# Patient Record
(demographics unavailable — no encounter records)

---

## 2025-03-18 NOTE — HISTORY OF PRESENT ILLNESS
[de-identified] : 14-year-old male is here with his dad for evaluation of his right shoulder.  Patient states been having pain in the shoulder for the last 2 weeks.  He had no specific injury or trauma.  He states pain is worse with certain movements.  He saw his pediatrician who recommended he follow-up here.  He also has pectus excavatum which they were sending him here for evaluation for that as well.  I discussed with patient's dad that we do not treat that here he would need to see a thoracic surgeon.

## 2025-03-18 NOTE — IMAGING
[de-identified] : On examination of his right shoulder he has tenderness over the anterior glenohumeral joint.  No tenderness over the AC joint or the clavicle.  He has full range of motion of the shoulder with mild pain.  Negative speeds, negative drop arm, negative Niobrara's.  Mild pain with apprehension and Baez.  He has 5-5 strength, sensation is intact throughout, 2+ radial pulse.  X-rays taken in the office today of the right shoulder show no fractures, dislocations, or other bony abnormalities.

## 2025-03-18 NOTE — DISCUSSION/SUMMARY
[de-identified] : At this time discussed with the patient and his dad I believe he has some tendinitis of his right shoulder.  I am recommending he do some physical therapy.  I have given him a prescription for this.   Warm compresses and ibuprofen at home as needed for pain.  Will see him back in a few weeks for repeat evaluation if the pain is not improving. Patient's parent will call me if any other problems or concerns.  They verbalized understanding and agreed with the plan, all questions were answered in the office today.

## 2025-07-02 NOTE — HISTORY OF PRESENT ILLNESS
[FreeTextEntry1] : Dear Dr. GULSHAN RIDLEY,   I had the pleasure of seeing your patient, EDIL HERRON, in my office today, 07/02/2025. As you know, he is a 15 year old male referred to pediatric cardiology due to pectus excavatum.   Edil was referred for chest wall deformity/pectus excavatum.  He was previously evaluated by a cardiologist around age 3 for heart murmur, which has since resolved.  He is always had a tall, thin body habitus with long arm span.  No history of hypermobility.  No eye problems although has not seen a ophthalmologist.  He does not wear glasses.  No easy bruising or poor wound healing although he does have stretch marks throughout his back.  No history of pneumothorax or hernia. Edil is clinically asymptomatic. There is no history of chest pain, palpitations, SOB, headaches, vision changes, dizziness, or syncope. No fevers or URI symptoms.  No family history of sudden cardiac death.  His maternal aunt and paternal grandfather both were very tall but did not have diagnosis of a connective tissue disorder.

## 2025-07-02 NOTE — FAMILY HISTORY
[TextEntry] : No family history of CHD or sudden cardiac death.  His maternal aunt and paternal grandfather both were very tall but did not have diagnosis of a connective tissue disorder.

## 2025-07-02 NOTE — PHYSICAL EXAM
[TextEntry] : Gen: Well appearing, comfortable. +Tall, thin habitus. HEENT: Normal. Normal uvula (not bifid). Chest: +Pectus excavatum. CV: RRR, normal S1 and S2, no murmurs.  Resp: CTAB, no wheezing or rhonchi. Abd: Soft, non-tender and non-distended. BS present, no HSM. Ext: Cap refill < 2 seconds. 2+ pulses bilaterally. Skin: Pink and warm. +Stretch marks throughout back. + Wrist sign.  + Thumb sign.

## 2025-07-02 NOTE — DISCUSSION/SUMMARY
[FreeTextEntry1] : In summary, ROBERT is a 15 year old male here for pectus excavatum His physical exam has features concerning for possible connective tissue disorder. His EKG shows sinus rhythm, and his echocardiogram shows normal intracardiac anatomy with good biventricular systolic function and no effusion. Given these results and his clinical presentation, I provided reassurance and explained that ROBERT has a structurally normal heart.  We discussed connective tissue disorders during today's visit. I recommended evaluation with plan for genetic testing for CT disorders at [facility].  Need for follow-up and additional noncardiac testing will depend on results of that evaluation.  Plan: -Referred to [facility]/evaluate for connective tissue disorder. - Return as needed for any new and/or worsening symptoms. - No activity restrictions. - No SBE prophylaxis.     Please do not hesitate to contact me if you have any questions.   Sebastian Watts MD, MS, FAAP, FACC Attending Physician, Pediatric Cardiology [facility] [address], [address] [place] [address] Office: [phone] Fax: [phone] Email: [email or web address]     I have spent 50 minutes of time on the encounter excluding separately reported services.

## 2025-07-02 NOTE — CARDIOLOGY SUMMARY
[Today's Date] : [unfilled] [FreeTextEntry1] : Sinus rhythm. Normal QRS axis. Normal intervals. No hypertrophy, no pre-excitation, no ST segment or T wave abnormalities.  [FreeTextEntry2] : Normal segmental anatomy, normally-related great vessels. No septal defects or PDA. No significant valvar regurgitation (trivial, physiologic TR/PI), stenosis, or outflow obstruction. No ventricular hypertrophy. Normal biventricular function. Normal origins of the coronary arteries. Normal aortic arch and descending aortic Doppler tracing. No significant pericardial effusion.